# Patient Record
Sex: FEMALE | Race: BLACK OR AFRICAN AMERICAN | URBAN - METROPOLITAN AREA
[De-identification: names, ages, dates, MRNs, and addresses within clinical notes are randomized per-mention and may not be internally consistent; named-entity substitution may affect disease eponyms.]

---

## 2018-11-16 ENCOUNTER — OPTICAL OFFICE (OUTPATIENT)
Dept: URBAN - METROPOLITAN AREA CLINIC 134 | Facility: CLINIC | Age: 53
Setting detail: OPHTHALMOLOGY
End: 2018-11-16
Payer: COMMERCIAL

## 2018-11-16 ENCOUNTER — DOCTOR'S OFFICE (OUTPATIENT)
Dept: URBAN - METROPOLITAN AREA CLINIC 125 | Facility: CLINIC | Age: 53
Setting detail: OPHTHALMOLOGY
End: 2018-11-16
Payer: COMMERCIAL

## 2018-11-16 DIAGNOSIS — H52.13: ICD-10-CM

## 2018-11-16 DIAGNOSIS — H52.223: ICD-10-CM

## 2018-11-16 DIAGNOSIS — H52.4: ICD-10-CM

## 2018-11-16 PROBLEM — H25.13 CATARACT NUCLEAR SCLEROSIS; BOTH EYES: Status: ACTIVE | Noted: 2018-11-16

## 2018-11-16 PROBLEM — H02.432: Status: ACTIVE | Noted: 2018-11-16

## 2018-11-16 PROCEDURE — 92015 DETERMINE REFRACTIVE STATE: CPT | Performed by: OPTOMETRIST

## 2018-11-16 PROCEDURE — 92004 COMPRE OPH EXAM NEW PT 1/>: CPT | Performed by: OPTOMETRIST

## 2018-11-16 PROCEDURE — V2020 VISION SVCS FRAMES PURCHASES: HCPCS | Performed by: OPTOMETRIST

## 2018-11-16 PROCEDURE — V9999 DISPENSING FEE: HCPCS | Performed by: OPTOMETRIST

## 2018-11-16 PROCEDURE — V2025 EYEGLASSES DELUX FRAMES: HCPCS | Performed by: OPTOMETRIST

## 2018-11-16 ASSESSMENT — REFRACTION_MANIFEST
OD_ADD: +1.75
OS_SPHERE: -2.00
OS_VA1: 20/
OU_VA: 20/
OS_VA2: 20/
OD_VA1: 20/
OS_VA2: 20/
OD_VA1: 20/20
OD_VA2: 20/
OS_CYLINDER: -0.50
OS_VA1: 20/25
OS_ADD: +1.75
OD_SPHERE: -1.00
OS_AXIS: 75
OD_CYLINDER: -0.75
OD_VA3: 20/
OD_VA2: 20/
OD_AXIS: 50
OS_VA3: 20/
OS_VA3: 20/
OD_VA3: 20/
OU_VA: 20/

## 2018-11-16 ASSESSMENT — AXIALLENGTH_DERIVED
OS_AL: 23.2289
OD_AL: 23.2154
OS_AL: 23.3238
OD_AL: 23.2154

## 2018-11-16 ASSESSMENT — REFRACTION_CURRENTRX
OD_OVR_VA: 20/
OS_OVR_VA: 20/
OS_CYLINDER: SPH
OD_SPHERE: -1.50
OS_OVR_VA: 20/
OD_CYLINDER: -0.75
OS_SPHERE: -2.25
OS_VPRISM_DIRECTION: SV
OD_OVR_VA: 20/
OS_OVR_VA: 20/
OD_AXIS: 033
OD_VPRISM_DIRECTION: SV
OD_OVR_VA: 20/
OS_AXIS: 180

## 2018-11-16 ASSESSMENT — CONFRONTATIONAL VISUAL FIELD TEST (CVF)
OS_FINDINGS: FULL
OD_FINDINGS: FULL

## 2018-11-16 ASSESSMENT — SPHEQUIV_DERIVED
OD_SPHEQUIV: -1.375
OD_SPHEQUIV: -1.375
OS_SPHEQUIV: -2.25
OS_SPHEQUIV: -2

## 2018-11-16 ASSESSMENT — REFRACTION_AUTOREFRACTION
OS_AXIS: 096
OS_SPHERE: -1.75
OD_SPHERE: -1.00
OS_CYLINDER: -0.50
OD_CYLINDER: -0.75
OD_AXIS: 027

## 2018-11-16 ASSESSMENT — KERATOMETRY
OD_K2POWER_DIOPTERS: 46.50
OD_AXISANGLE_DEGREES: 018
OS_K2POWER_DIOPTERS: 47.00
OD_K1POWER_DIOPTERS: 45.50
OS_K1POWER_DIOPTERS: 46.25
OS_AXISANGLE_DEGREES: 160

## 2018-11-16 ASSESSMENT — LID POSITION - PTOSIS: OS_PTOSIS: 2+

## 2018-11-16 ASSESSMENT — VISUAL ACUITY
OD_BCVA: 20/30+2
OS_BCVA: 20/20

## 2024-01-21 ENCOUNTER — HOSPITAL ENCOUNTER (EMERGENCY)
Facility: HOSPITAL | Age: 59
Discharge: HOME | End: 2024-01-21
Attending: EMERGENCY MEDICINE | Admitting: EMERGENCY MEDICINE
Payer: COMMERCIAL

## 2024-01-21 ENCOUNTER — APPOINTMENT (EMERGENCY)
Dept: RADIOLOGY | Facility: HOSPITAL | Age: 59
End: 2024-01-21
Payer: COMMERCIAL

## 2024-01-21 VITALS
RESPIRATION RATE: 18 BRPM | OXYGEN SATURATION: 98 % | SYSTOLIC BLOOD PRESSURE: 181 MMHG | TEMPERATURE: 97.1 F | HEART RATE: 89 BPM | DIASTOLIC BLOOD PRESSURE: 112 MMHG

## 2024-01-21 DIAGNOSIS — M54.42 ACUTE LEFT-SIDED LOW BACK PAIN WITH LEFT-SIDED SCIATICA: Primary | ICD-10-CM

## 2024-01-21 LAB
BILIRUB UR QL STRIP: NEGATIVE
CLARITY UR: CLEAR
COLOR UR: YELLOW
GLUCOSE UR STRIP-MCNC: NEGATIVE MG/DL
HGB UR QL STRIP.AUTO: NEGATIVE
KETONES UR STRIP-MCNC: NEGATIVE MG/DL
LEUKOCYTE ESTERASE UR QL STRIP: NEGATIVE
NITRITE UR QL STRIP: NEGATIVE
PH UR STRIP.AUTO: 6.5 [PH]
PROT UR STRIP-MCNC: NEGATIVE MG/DL
SP GR UR STRIP.AUTO: 1.02 (ref 1–1.03)
UROBILINOGEN UR QL STRIP.AUTO: 0.2 E.U./DL

## 2024-01-21 PROCEDURE — 99285 EMERGENCY DEPT VISIT HI MDM: CPT | Performed by: EMERGENCY MEDICINE

## 2024-01-21 PROCEDURE — 96372 THER/PROPH/DIAG INJ SC/IM: CPT

## 2024-01-21 PROCEDURE — 81003 URINALYSIS AUTO W/O SCOPE: CPT | Performed by: EMERGENCY MEDICINE

## 2024-01-21 PROCEDURE — 74018 RADEX ABDOMEN 1 VIEW: CPT

## 2024-01-21 PROCEDURE — 72100 X-RAY EXAM L-S SPINE 2/3 VWS: CPT

## 2024-01-21 PROCEDURE — 99284 EMERGENCY DEPT VISIT MOD MDM: CPT

## 2024-01-21 RX ORDER — ROSUVASTATIN CALCIUM 40 MG/1
1 TABLET, COATED ORAL EVERY EVENING
COMMUNITY
Start: 2023-09-13 | End: 2024-09-12

## 2024-01-21 RX ORDER — CYCLOBENZAPRINE HCL 10 MG
10 TABLET ORAL 2 TIMES DAILY PRN
Qty: 12 TABLET | Refills: 0 | Status: SHIPPED | OUTPATIENT
Start: 2024-01-21

## 2024-01-21 RX ORDER — KETOROLAC TROMETHAMINE 30 MG/ML
30 INJECTION, SOLUTION INTRAMUSCULAR; INTRAVENOUS ONCE
Status: COMPLETED | OUTPATIENT
Start: 2024-01-21 | End: 2024-01-21

## 2024-01-21 RX ORDER — MELOXICAM 15 MG/1
15 TABLET ORAL DAILY
COMMUNITY
Start: 2023-05-08 | End: 2024-05-07

## 2024-01-21 RX ORDER — TRAMADOL HYDROCHLORIDE 50 MG/1
50 TABLET ORAL EVERY 6 HOURS PRN
Qty: 20 TABLET | Refills: 0 | Status: SHIPPED | OUTPATIENT
Start: 2024-01-21 | End: 2024-01-31

## 2024-01-21 RX ORDER — AMLODIPINE BESYLATE 10 MG/1
10 TABLET ORAL DAILY
COMMUNITY
Start: 2023-09-13 | End: 2024-09-12

## 2024-01-21 RX ADMIN — KETOROLAC TROMETHAMINE 30 MG: 30 INJECTION, SOLUTION INTRAMUSCULAR; INTRAVENOUS at 19:38

## 2024-01-22 NOTE — ED PROVIDER NOTES
History  Chief Complaint   Patient presents with    Flank Pain     Pt states a few days ago she was moving some heavy things in her house and then developed left flank pain. States increased frequency in urination     Patient is a 58-year-old female presenting to the emergency department complaining of left low back/flank pain that is been bothering her since lifting a mattress a few days ago, she does report some dysuria and increased urinary frequency as well, has a history of kidney stone but admits that this pain is different, she has tried OTC anti-inflammatories which provided no relief of pain, denies any bowel or bladder dysfunction, no fever or chills, no numbness or tingling to her groin area or extremities, the pain does shoot down the left leg at times        Prior to Admission Medications   Prescriptions Last Dose Informant Patient Reported? Taking?   amLODIPine (NORVASC) 10 mg tablet 1/21/2024  Yes Yes   Sig: Take 10 mg by mouth daily   meloxicam (MOBIC) 15 mg tablet 1/21/2024  Yes Yes   Sig: Take 15 mg by mouth daily   rosuvastatin (CRESTOR) 40 MG tablet 1/21/2024  Yes Yes   Sig: Take 1 tablet by mouth every evening      Facility-Administered Medications: None       Past Medical History:   Diagnosis Date    Renal disorder     kidney stone       Past Surgical History:   Procedure Laterality Date    JOINT REPLACEMENT         History reviewed. No pertinent family history.  I have reviewed and agree with the history as documented.    E-Cigarette/Vaping     E-Cigarette/Vaping Substances     Social History     Tobacco Use    Smoking status: Every Day     Current packs/day: 0.50     Types: Cigarettes    Smokeless tobacco: Never   Substance Use Topics    Alcohol use: Never    Drug use: Never       Review of Systems   Constitutional: Negative.    HENT: Negative.     Eyes: Negative.    Respiratory: Negative.     Cardiovascular: Negative.    Gastrointestinal: Negative.    Endocrine: Negative.    Genitourinary:   Positive for dysuria, flank pain and frequency.   Musculoskeletal:  Positive for back pain.   Skin: Negative.    Allergic/Immunologic: Negative.    Neurological: Negative.    Hematological: Negative.    Psychiatric/Behavioral: Negative.         Physical Exam  Physical Exam  Constitutional:       Appearance: She is well-developed.   HENT:      Head: Normocephalic and atraumatic.   Eyes:      Conjunctiva/sclera: Conjunctivae normal.      Pupils: Pupils are equal, round, and reactive to light.   Cardiovascular:      Rate and Rhythm: Normal rate.   Pulmonary:      Effort: Pulmonary effort is normal.   Abdominal:      Palpations: Abdomen is soft.   Musculoskeletal:         General: Normal range of motion.      Cervical back: Normal range of motion and neck supple.        Back:       Comments: Tenderness to palpate in the left lower lumbar paraspinal musculature into the buttocks, pain with straight leg raise, distal sensation and motor is intact   Skin:     General: Skin is warm and dry.   Neurological:      Mental Status: She is alert and oriented to person, place, and time.         Vital Signs  ED Triage Vitals   Temperature Pulse Respirations Blood Pressure SpO2   01/21/24 1855 01/21/24 1855 01/21/24 1855 01/21/24 1856 01/21/24 1855   (!) 97.1 °F (36.2 °C) 89 18 (!) 181/112 98 %      Temp Source Heart Rate Source Patient Position - Orthostatic VS BP Location FiO2 (%)   01/21/24 1855 -- -- -- --   Temporal          Pain Score       01/21/24 1855       8           Vitals:    01/21/24 1855 01/21/24 1856   BP:  (!) 181/112   Pulse: 89          Visual Acuity      ED Medications  Medications   ketorolac (TORADOL) injection 30 mg (30 mg Intramuscular Given 1/21/24 1938)       Diagnostic Studies  Results Reviewed       Procedure Component Value Units Date/Time    UA w Reflex to Microscopic w Reflex to Culture [[id number]] Collected: 01/21/24 1941    Lab Status: Final result Specimen: Urine, Clean Catch Updated: 01/21/24 1948      Color, UA Yellow     Clarity, UA Clear     Specific Gravity, UA 1.020     pH, UA 6.5     Leukocytes, UA Negative     Nitrite, UA Negative     Protein, UA Negative mg/dl      Glucose, UA Negative mg/dl      Ketones, UA Negative mg/dl      Urobilinogen, UA 0.2 E.U./dl      Bilirubin, UA Negative     Occult Blood, UA Negative                   XR spine lumbar 2 or 3 views injury   ED Interpretation by Lilian Zazueta DO (01/21 2320)   No acute findings      XR abdomen 1 view kub   ED Interpretation by Lilian Zazueta DO (01/21 2320)   No acute findings                 Procedures  Procedures         ED Course  ED Course as of 01/21/24 2321   Sun Jan 21, 2024   2320 UA w Reflex to Microscopic w Reflex to Culture   2320 XR abdomen 1 view kub   2320 XR spine lumbar 2 or 3 views injury                               SBIRT 22yo+      Flowsheet Row Most Recent Value   Initial Alcohol Screen: US AUDIT-C     1. How often do you have a drink containing alcohol? 0 Filed at: 01/21/2024 1856   2. How many drinks containing alcohol do you have on a typical day you are drinking?  0 Filed at: 01/21/2024 1856   3b. FEMALE Any Age, or MALE 65+: How often do you have 4 or more drinks on one occassion? 0 Filed at: 01/21/2024 1856   Audit-C Score 0 Filed at: 01/21/2024 1856   LASHELL: How many times in the past year have you...    Used an illegal drug or used a prescription medication for non-medical reasons? Never Filed at: 01/21/2024 1856                      Medical Decision Making  Patient presents with low back pain most likely consistent with lumbar radiculopathy.  Differential diagnosis includes lumbago versus musculoskeletal spasm/sprain versus sciatica.  No back pain red flags on history or physical.  Presentation not consistent with malignancy, fracture, cauda equina syndrome, AAA, viscus perforation, osteomyelitis or epidural abscess, renal colic, pyelonephritis or other infectious process.  Given the clinical picture, no  indication for further imaging at this time.      Problems Addressed:  Acute left-sided low back pain with left-sided sciatica: acute illness or injury    Amount and/or Complexity of Data Reviewed  Radiology: ordered and independent interpretation performed. Decision-making details documented in ED Course.    Risk  OTC drugs.  Prescription drug management.             Disposition  Final diagnoses:   Acute left-sided low back pain with left-sided sciatica     Time reflects when diagnosis was documented in both MDM as applicable and the Disposition within this note       Time User Action Codes Description Comment    1/21/2024  8:20 PM Lilian Zazueta [M54.42] Acute left-sided low back pain with left-sided sciatica           ED Disposition       ED Disposition   Discharge    Condition   Stable    Date/Time   Sun Jan 21, 2024 2020    Comment   Lily Alicea discharge to home/self care.                   Follow-up Information       Follow up With Specialties Details Why Contact Info    Alcira Coreas Dr., MD Internal Medicine In 3 days As needed [address] [place] [address]  [phone]              Discharge Medication List as of 1/21/2024  8:22 PM        START taking these medications    Details   cyclobenzaprine (FLEXERIL) 10 mg tablet Take 1 tablet (10 mg total) by mouth 2 (two) times a day as needed for muscle spasms, Starting Sun 1/21/2024, Normal      traMADol (Ultram) 50 mg tablet Take 1 tablet (50 mg total) by mouth every 6 (six) hours as needed for moderate pain for up to 10 days, Starting Sun 1/21/2024, Until Wed 1/31/2024 at 2359, Normal           CONTINUE these medications which have NOT CHANGED    Details   amLODIPine (NORVASC) 10 mg tablet Take 10 mg by mouth daily, Starting Wed 9/13/2023, Until Thu 9/12/2024, Historical Med      meloxicam (MOBIC) 15 mg tablet Take 15 mg by mouth daily, Starting Mon 5/8/2023, Until Tue 5/7/2024, Historical Med      rosuvastatin (CRESTOR) 40  MG tablet Take 1 tablet by mouth every evening, Starting Wed 9/13/2023, Until Thu 9/12/2024, Historical Med             No discharge procedures on file.    PDMP Review       None            ED Provider  Electronically Signed by             Lilian Zazueta DO  01/21/24 2466